# Patient Record
Sex: FEMALE | Race: WHITE
[De-identification: names, ages, dates, MRNs, and addresses within clinical notes are randomized per-mention and may not be internally consistent; named-entity substitution may affect disease eponyms.]

---

## 2021-03-01 ENCOUNTER — HOSPITAL ENCOUNTER
Dept: HOSPITAL 49 - FER | Age: 51
LOS: 1 days | Discharge: HOME | End: 2021-03-02
Attending: HOSPITALIST | Admitting: HOSPITALIST
Payer: MEDICARE

## 2021-03-01 DIAGNOSIS — K22.70: ICD-10-CM

## 2021-03-01 DIAGNOSIS — I50.9: ICD-10-CM

## 2021-03-01 DIAGNOSIS — Z90.49: ICD-10-CM

## 2021-03-01 DIAGNOSIS — R60.1: Primary | ICD-10-CM

## 2021-03-01 DIAGNOSIS — I25.10: ICD-10-CM

## 2021-03-01 DIAGNOSIS — K21.9: ICD-10-CM

## 2021-03-01 DIAGNOSIS — E78.5: ICD-10-CM

## 2021-03-01 DIAGNOSIS — J44.9: ICD-10-CM

## 2021-03-01 DIAGNOSIS — Z88.8: ICD-10-CM

## 2021-03-01 DIAGNOSIS — R00.1: ICD-10-CM

## 2021-03-01 DIAGNOSIS — Z86.73: ICD-10-CM

## 2021-03-01 DIAGNOSIS — Z95.5: ICD-10-CM

## 2021-03-01 DIAGNOSIS — Z79.82: ICD-10-CM

## 2021-03-01 DIAGNOSIS — F43.10: ICD-10-CM

## 2021-03-01 DIAGNOSIS — N18.9: ICD-10-CM

## 2021-03-01 DIAGNOSIS — Z91.041: ICD-10-CM

## 2021-03-01 DIAGNOSIS — G89.29: ICD-10-CM

## 2021-03-01 DIAGNOSIS — Z20.822: ICD-10-CM

## 2021-03-01 DIAGNOSIS — Z98.890: ICD-10-CM

## 2021-03-01 DIAGNOSIS — Z82.49: ICD-10-CM

## 2021-03-01 DIAGNOSIS — E03.9: ICD-10-CM

## 2021-03-01 DIAGNOSIS — G35: ICD-10-CM

## 2021-03-01 DIAGNOSIS — Z79.899: ICD-10-CM

## 2021-03-01 DIAGNOSIS — I25.2: ICD-10-CM

## 2021-03-01 DIAGNOSIS — R10.9: ICD-10-CM

## 2021-03-01 DIAGNOSIS — I13.0: ICD-10-CM

## 2021-03-01 DIAGNOSIS — M54.9: ICD-10-CM

## 2021-03-01 LAB
ALBUMIN SERPL-MCNC: 3.8 G/DL (ref 3.4–5)
ALKALINE PHOSHATASE: 102 U/L (ref 46–116)
ALT SERPL-CCNC: 53 U/L (ref 14–59)
AST: 25 U/L (ref 15–37)
BASOPHIL: 0.4 % (ref 0–2)
BILIRUBIN - TOTAL: 0.2 MG/DL (ref 0.2–1)
BILIRUBIN: NEGATIVE MG/DL
BLOOD: NEGATIVE ERY/UL
BUN SERPL-MCNC: 21 MG/DL (ref 7–18)
BUN/CREAT RATIO (CALC): 28.8 RATIO
CHLORIDE: 101 MMOL/L (ref 98–107)
CLARITY UR: CLEAR
CO2 (BICARBONATE): 28 MMOL/L (ref 21–32)
COLOR: YELLOW
CREATININE: 0.73 MG/DL (ref 0.51–0.95)
EOSINOPHIL: 0.5 % (ref 0–5)
GLOBULIN (CALCULATION): 3.7 G/DL
GLUCOSE (U): NORMAL MG/DL
GLUCOSE SERPL-MCNC: 111 MG/DL (ref 74–106)
HCT: 41.3 % (ref 37–47)
HGB BLD-MCNC: 13.8 G/DL (ref 12.5–16)
LEUKOCYTES: NEGATIVE LEU/UL
LYMPHOCYTE: 28.9 % (ref 15–48)
MCH RBC QN AUTO: 29.9 PG (ref 25–31)
MCHC RBC AUTO-ENTMCNC: 33.4 G/DL (ref 32–36)
MCV: 89.4 FL (ref 78–100)
MONOCYTE: 6.6 % (ref 0–12)
MPV: 10.1 FL (ref 6–9.5)
NEUTROPHIL: 63 % (ref 41–80)
NITRITE: NEGATIVE MG/DL
NRBC: 0
PLT: 264 K/UL (ref 150–400)
POTASSIUM: 3.6 MMOL/L (ref 3.5–5.1)
PROTEIN: NEGATIVE MG/DL
RBC MORPHOLOGY: NORMAL
RBC: 4.62 M/UL (ref 4.2–5.4)
RDW: 11.9 % (ref 11.5–14)
SPECIFIC GRAVITY: 1.01 (ref 1–1.03)
TOTAL PROTEIN: 7.5 G/DL (ref 6.4–8.2)
UROBILINOGEN: 0.2 MG/DL (ref 0.2–1)
WBC: 9.3 K/UL (ref 4–10.5)

## 2021-03-01 PROCEDURE — U0002 COVID-19 LAB TEST NON-CDC: HCPCS

## 2021-03-01 PROCEDURE — G0378 HOSPITAL OBSERVATION PER HR: HCPCS

## 2021-03-02 LAB
ALBUMIN SERPL-MCNC: 3.7 G/DL (ref 3.4–5)
ALKALINE PHOSHATASE: 90 U/L (ref 46–116)
ALT SERPL-CCNC: 53 U/L (ref 14–59)
AST: 29 U/L (ref 15–37)
BASOPHIL: 0.5 % (ref 0–2)
BILIRUBIN - TOTAL: 0.3 MG/DL (ref 0.2–1)
BUN SERPL-MCNC: 18 MG/DL (ref 7–18)
BUN SERPL-MCNC: 19 MG/DL (ref 7–18)
BUN/CREAT RATIO (CALC): 28.6 RATIO
BUN/CREAT RATIO (CALC): 31.1 RATIO
CHLORIDE: 102 MMOL/L (ref 98–107)
CHLORIDE: 104 MMOL/L (ref 98–107)
CHOLEST SERPL-MCNC: 248 MG/DL (ref ?–200)
CO2 (BICARBONATE): 29 MMOL/L (ref 21–32)
CO2 (BICARBONATE): 30 MMOL/L (ref 21–32)
CREATININE: 0.61 MG/DL (ref 0.51–0.95)
CREATININE: 0.63 MG/DL (ref 0.51–0.95)
EOSINOPHIL: 0.4 % (ref 0–5)
GLOBULIN (CALCULATION): 3.5 G/DL
GLUCOSE SERPL-MCNC: 116 MG/DL (ref 74–106)
GLUCOSE SERPL-MCNC: 121 MG/DL (ref 74–106)
HCT: 39.8 % (ref 37–47)
HDL: 81 MG/DL (ref 40–60)
HGB BLD-MCNC: 13.2 G/DL (ref 12.5–16)
LDL - DIRECT: 141 MG/DL (ref ?–100)
LYMPHOCYTE: 27.8 % (ref 15–48)
MAGNESIUM SERPL-MCNC: 2.2 MG/DL (ref 1.8–2.4)
MCH RBC QN AUTO: 29.7 PG (ref 25–31)
MCHC RBC AUTO-ENTMCNC: 33.2 G/DL (ref 32–36)
MCV: 89.6 FL (ref 78–100)
MONOCYTE: 6.4 % (ref 0–12)
MPV: 9.9 FL (ref 6–9.5)
NEUTROPHIL: 64.2 % (ref 41–80)
NRBC: 0
PLT: 252 K/UL (ref 150–400)
POTASSIUM: 3.8 MMOL/L (ref 3.5–5.1)
POTASSIUM: 4 MMOL/L (ref 3.5–5.1)
RBC MORPHOLOGY: NORMAL
RBC: 4.44 M/UL (ref 4.2–5.4)
RDW: 11.9 % (ref 11.5–14)
TOTAL PROTEIN: 7.2 G/DL (ref 6.4–8.2)
TRIGLYCERIDES: 150 MG/DL (ref ?–150)
WBC: 8.6 K/UL (ref 4–10.5)

## 2021-03-02 NOTE — NUR
WHILE PROVIDING PATIENT DISCHARGE INSTRUCTIONS, THIS RN ASKED IF PATIENT HAD
SOMEONE TO PICK HER UP TO TAKE HER HOME. PATIENT VERBALIZED THAT HER VEHICLE
IS PARKED OUTSIDE THE EMERGENCY ROOM AND SHE WILL BE DRIVING HERSELF HOME.
OFFERED TO TAKE PATIENT TO THE PARKING LOT VIA WHEELCHAIR AND PATIENT
DECLINED, STATING "I CAN WALK".

## 2021-03-03 LAB
DRVVT: 34.5 SEC (ref 0–47)
HEP C VIRUS AB: <0.1 (ref 0–0.9)
PTT-LA: 31.3 SEC (ref 0–51.9)

## 2021-06-11 ENCOUNTER — HOSPITAL ENCOUNTER (EMERGENCY)
Dept: HOSPITAL 49 - FER | Age: 51
Discharge: HOME | End: 2021-06-11
Payer: MEDICARE

## 2021-06-11 DIAGNOSIS — S93.402A: Primary | ICD-10-CM

## 2021-06-11 DIAGNOSIS — I25.2: ICD-10-CM
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
## 2021-06-11 DIAGNOSIS — I48.91: ICD-10-CM

## 2021-06-11 DIAGNOSIS — J44.9: ICD-10-CM

## 2021-06-11 DIAGNOSIS — Z86.711: ICD-10-CM

## 2021-06-11 DIAGNOSIS — G35: ICD-10-CM
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
## 2021-06-11 DIAGNOSIS — Z91.041: ICD-10-CM

## 2021-06-11 LAB
ALBUMIN SERPL-MCNC: 3.8 G/DL (ref 3.4–5)
ALKALINE PHOSHATASE: 105 U/L (ref 46–116)
ALT SERPL-CCNC: 62 U/L (ref 14–59)
AST: 40 U/L (ref 15–37)
BASOPHIL: 0.4 % (ref 0–2)
BILIRUBIN - TOTAL: 0.3 MG/DL (ref 0.2–1)
BUN SERPL-MCNC: 15 MG/DL (ref 7–18)
BUN/CREAT RATIO (CALC): 20 RATIO
CHLORIDE: 104 MMOL/L (ref 98–107)
CO2 (BICARBONATE): 23 MMOL/L (ref 21–32)
CREATININE: 0.75 MG/DL (ref 0.51–0.95)
EOSINOPHIL: 5 % (ref 0–5)
GLOBULIN (CALCULATION): 3.8 G/DL
GLUCOSE SERPL-MCNC: 127 MG/DL (ref 74–106)
HCT: 39.6 % (ref 37–47)
HGB BLD-MCNC: 13 G/DL (ref 12.5–16)
LYMPHOCYTE: 34.5 % (ref 15–48)
MCH RBC QN AUTO: 30.1 PG (ref 25–31)
MCHC RBC AUTO-ENTMCNC: 32.8 G/DL (ref 32–36)
MCV: 91.7 FL (ref 78–100)
MONOCYTE: 6.5 % (ref 0–12)
MPV: 9.9 FL (ref 6–9.5)
NEUTROPHIL: 53.2 % (ref 41–80)
NRBC: 0
PLT: 217 K/UL (ref 150–400)
POTASSIUM: 3.5 MMOL/L (ref 3.5–5.1)
RBC MORPHOLOGY: NORMAL
RBC: 4.32 M/UL (ref 4.2–5.4)
RDW: 12.1 % (ref 11.5–14)
TOTAL PROTEIN: 7.6 G/DL (ref 6.4–8.2)
WBC: 7.8 K/UL (ref 4–10.5)

## 2021-07-18 ENCOUNTER — HOSPITAL ENCOUNTER (EMERGENCY)
Dept: HOSPITAL 49 - FER | Age: 51
Discharge: HOME | End: 2021-07-18
Payer: MEDICARE

## 2021-07-18 DIAGNOSIS — Z79.82: ICD-10-CM

## 2021-07-18 DIAGNOSIS — Z88.8: ICD-10-CM

## 2021-07-18 DIAGNOSIS — E11.9: ICD-10-CM

## 2021-07-18 DIAGNOSIS — M23.92: Primary | ICD-10-CM

## 2021-07-18 DIAGNOSIS — Z88.0: ICD-10-CM

## 2021-07-18 DIAGNOSIS — I51.9: ICD-10-CM

## 2021-07-18 DIAGNOSIS — Z79.899: ICD-10-CM

## 2021-10-24 ENCOUNTER — HOSPITAL ENCOUNTER (EMERGENCY)
Dept: HOSPITAL 49 - FER | Age: 51
Discharge: HOME | End: 2021-10-24
Payer: MEDICARE

## 2021-10-24 DIAGNOSIS — N39.0: Primary | ICD-10-CM

## 2021-10-24 DIAGNOSIS — I48.91: ICD-10-CM

## 2021-10-24 DIAGNOSIS — J44.9: ICD-10-CM

## 2021-10-24 DIAGNOSIS — I25.10: ICD-10-CM

## 2021-10-24 DIAGNOSIS — Z91.041: ICD-10-CM

## 2021-10-24 DIAGNOSIS — R10.31: ICD-10-CM

## 2021-10-24 DIAGNOSIS — Z88.8: ICD-10-CM

## 2021-10-24 LAB
ALBUMIN SERPL-MCNC: 3.7 G/DL (ref 3.4–5)
ALKALINE PHOSHATASE: 93 U/L (ref 46–116)
ALT SERPL-CCNC: 49 U/L (ref 14–59)
AST: 28 U/L (ref 15–37)
BACTERIA: (no result)
BASOPHIL: 0.5 % (ref 0–2)
BILIRUBIN - TOTAL: 0.2 MG/DL (ref 0.2–1)
BILIRUBIN: NEGATIVE MG/DL
BLOOD: NEGATIVE ERY/UL
BUN SERPL-MCNC: 20 MG/DL (ref 7–18)
BUN/CREAT RATIO (CALC): 22 RATIO
CHLORIDE: 103 MMOL/L (ref 98–107)
CLARITY UR: CLEAR
CO2 (BICARBONATE): 28 MMOL/L (ref 21–32)
COLOR: YELLOW
CREATININE: 0.91 MG/DL (ref 0.51–0.95)
EOSINOPHIL: 2.8 % (ref 0–5)
GLOBULIN (CALCULATION): 3.6 G/DL
GLUCOSE (U): NORMAL MG/DL
GLUCOSE SERPL-MCNC: 61 MG/DL (ref 74–106)
HCT: 40 % (ref 37–47)
HGB BLD-MCNC: 13.3 G/DL (ref 12.5–16)
LEUKOCYTES: (no result) LEU/UL
LIPASE: 94 U/L (ref 73–393)
LYMPHOCYTE: 33.3 % (ref 15–48)
MCH RBC QN AUTO: 29.8 PG (ref 25–31)
MCHC RBC AUTO-ENTMCNC: 33.3 G/DL (ref 32–36)
MCV: 89.7 FL (ref 78–100)
MONOCYTE: 4.6 % (ref 0–12)
MPV: 9.5 FL (ref 6–9.5)
NEUTROPHIL: 58.5 % (ref 41–80)
NITRITE: NEGATIVE MG/DL
NRBC: 0
PLT: 225 K/UL (ref 150–400)
POTASSIUM: 3.7 MMOL/L (ref 3.5–5.1)
PROTEIN: NEGATIVE MG/DL
RBC MORPHOLOGY: NORMAL
RBC: 4.46 M/UL (ref 4.2–5.4)
RDW: 12.3 % (ref 11.5–14)
SPECIFIC GRAVITY: 1.02 (ref 1–1.03)
SQUAMOUS EPITHELIAL CELLS: (no result)
TOTAL PROTEIN: 7.3 G/DL (ref 6.4–8.2)
URINARY WBC: (no result)
UROBILINOGEN: 0.2 MG/DL (ref 0.2–1)
WBC: 6.5 K/UL (ref 4–10.5)

## 2021-12-24 ENCOUNTER — HOSPITAL ENCOUNTER (EMERGENCY)
Dept: HOSPITAL 49 - FER | Age: 51
LOS: 1 days | Discharge: HOME | End: 2021-12-25
Payer: MEDICARE

## 2021-12-24 DIAGNOSIS — Z88.5: ICD-10-CM

## 2021-12-24 DIAGNOSIS — Z86.73: ICD-10-CM

## 2021-12-24 DIAGNOSIS — R11.0: ICD-10-CM

## 2021-12-24 DIAGNOSIS — I25.2: ICD-10-CM

## 2021-12-24 DIAGNOSIS — Z91.041: ICD-10-CM

## 2021-12-24 DIAGNOSIS — Z20.822: ICD-10-CM

## 2021-12-24 DIAGNOSIS — Z88.8: ICD-10-CM

## 2021-12-24 DIAGNOSIS — R51.9: Primary | ICD-10-CM

## 2021-12-24 DIAGNOSIS — E11.9: ICD-10-CM

## 2021-12-24 LAB
BASOPHIL: 0.6 % (ref 0–2)
BUN SERPL-MCNC: 14 MG/DL (ref 7–18)
BUN/CREAT RATIO (CALC): 20 RATIO
C-REACTIVE PROTEIN: 0.8 MG/DL (ref ?–0.9)
CHLORIDE: 98 MMOL/L (ref 98–107)
CO2 (BICARBONATE): 31 MMOL/L (ref 21–32)
CORONAVIRUS 2019 SARS-COV-2: NEGATIVE
CREATININE: 0.7 MG/DL (ref 0.51–0.95)
EOSINOPHIL: 4.6 % (ref 0–5)
GLUCOSE SERPL-MCNC: 142 MG/DL (ref 74–106)
HCT: 44.5 % (ref 37–47)
HGB BLD-MCNC: 15.3 G/DL (ref 12.5–16)
INFLUENZA A NAA: NEGATIVE
LYMPHOCYTE: 28.8 % (ref 15–48)
MCH RBC QN AUTO: 29.8 PG (ref 25–31)
MCHC RBC AUTO-ENTMCNC: 34.4 G/DL (ref 32–36)
MCV: 86.6 FL (ref 78–100)
MONOCYTE: 5.4 % (ref 0–12)
MPV: 9.5 FL (ref 6–9.5)
NEUTROPHIL: 60.3 % (ref 41–80)
NRBC: 0
PLT: 227 K/UL (ref 150–400)
POTASSIUM: 3.9 MMOL/L (ref 3.5–5.1)
RBC MORPHOLOGY: NORMAL
RBC: 5.14 M/UL (ref 4.2–5.4)
RDW: 11.5 % (ref 11.5–14)
WBC: 6.9 K/UL (ref 4–10.5)

## 2021-12-24 PROCEDURE — U0002 COVID-19 LAB TEST NON-CDC: HCPCS

## 2022-02-10 ENCOUNTER — HOSPITAL ENCOUNTER (EMERGENCY)
Dept: HOSPITAL 49 - FER | Age: 52
LOS: 1 days | Discharge: HOME | End: 2022-02-11
Payer: MEDICARE

## 2022-02-10 DIAGNOSIS — Z88.5: ICD-10-CM

## 2022-02-10 DIAGNOSIS — Z88.8: ICD-10-CM

## 2022-02-10 DIAGNOSIS — F11.20: ICD-10-CM

## 2022-02-10 DIAGNOSIS — L29.9: Primary | ICD-10-CM

## 2022-02-10 DIAGNOSIS — G89.29: ICD-10-CM

## 2022-02-10 DIAGNOSIS — Z20.822: ICD-10-CM

## 2022-02-10 DIAGNOSIS — Z91.041: ICD-10-CM

## 2022-02-10 LAB
ALBUMIN SERPL-MCNC: 3.8 G/DL (ref 3.4–5)
ALKALINE PHOSHATASE: 114 U/L (ref 46–116)
ALT SERPL-CCNC: 70 U/L (ref 14–59)
AMPHETAMINES: NEGATIVE
APAP SERPL-MCNC: < 2 UG/ML (ref 10–30)
AST: 39 U/L (ref 15–37)
BARBITURATES: NEGATIVE
BASOPHIL: 0.5 % (ref 0–2)
BILIRUBIN - TOTAL: 0.2 MG/DL (ref 0.2–1)
BILIRUBIN: NEGATIVE MG/DL
BLOOD: NEGATIVE ERY/UL
BUN SERPL-MCNC: 6 MG/DL (ref 7–18)
BUN/CREAT RATIO (CALC): 10.2 RATIO
CHLORIDE: 100 MMOL/L (ref 98–107)
CLARITY UR: CLEAR
CO2 (BICARBONATE): 28 MMOL/L (ref 21–32)
COLOR: YELLOW
CORONAVIRUS 2019 SARS-COV-2: NEGATIVE
CREATININE: 0.59 MG/DL (ref 0.51–0.95)
ECSTASY (MDMA): NEGATIVE
EOSINOPHIL: 1.8 % (ref 0–5)
GLOBULIN (CALCULATION): 3.9 G/DL
GLUCOSE (U): NORMAL MG/DL
GLUCOSE SERPL-MCNC: 137 MG/DL (ref 74–106)
HCT: 37.9 % (ref 37–47)
HGB BLD-MCNC: 13.2 G/DL (ref 12.5–16)
INFLUENZA A NAA: NEGATIVE
INR PPP: 0.97 (ref 0.9–1.2)
LACTIC ACID: 1.5 MMOL/L (ref 0.4–1.9)
LEUKOCYTES: NEGATIVE LEU/UL
LYMPHOCYTE: 21.5 % (ref 15–48)
MARIJUANA (THC): NEGATIVE
MCH RBC QN AUTO: 29.3 PG (ref 25–31)
MCHC RBC AUTO-ENTMCNC: 34.8 G/DL (ref 32–36)
MCV: 84.2 FL (ref 78–100)
METHADONE: NEGATIVE
MONOCYTE: 5.5 % (ref 0–12)
MPV: 9.4 FL (ref 6–9.5)
NEUTROPHIL: 70 % (ref 41–80)
NITRITE: NEGATIVE MG/DL
NRBC: 0
OPIATES: POSITIVE
OXYCODONE: NEGATIVE
PLT: 278 K/UL (ref 150–400)
POTASSIUM: 3.8 MMOL/L (ref 3.5–5.1)
PROTEIN: NEGATIVE MG/DL
PROTHROMBIN TIME: 12.3 SECONDS (ref 11.8–13.4)
PTT: 29.6 SECONDS (ref 24.4–34.7)
RBC MORPHOLOGY: NORMAL
RBC: 4.5 M/UL (ref 4.2–5.4)
RDW: 11.8 % (ref 11.5–14)
SPECIFIC GRAVITY: 1.01 (ref 1–1.03)
TOTAL PROTEIN: 7.7 G/DL (ref 6.4–8.2)
UROBILINOGEN: 0.2 MG/DL (ref 0.2–1)
WBC: 8.2 K/UL (ref 4–10.5)

## 2022-02-10 PROCEDURE — G0480 DRUG TEST DEF 1-7 CLASSES: HCPCS

## 2022-02-10 PROCEDURE — U0002 COVID-19 LAB TEST NON-CDC: HCPCS

## 2022-05-07 ENCOUNTER — HOSPITAL ENCOUNTER (EMERGENCY)
Dept: HOSPITAL 49 - FER | Age: 52
Discharge: HOME | End: 2022-05-07
Payer: MEDICARE

## 2022-05-07 DIAGNOSIS — G43.909: Primary | ICD-10-CM

## 2022-05-07 DIAGNOSIS — Z79.51: ICD-10-CM

## 2022-05-07 DIAGNOSIS — J45.909: ICD-10-CM

## 2022-05-07 DIAGNOSIS — Z91.041: ICD-10-CM

## 2022-05-07 DIAGNOSIS — M25.512: ICD-10-CM

## 2022-05-07 DIAGNOSIS — Z28.310: ICD-10-CM

## 2022-05-07 LAB
AMORPH URATE CRY #/AREA URNS HPF: (no result) /[HPF]
BACTERIA: (no result)
BASOPHIL: 0.7 % (ref 0–2)
BILIRUBIN: NEGATIVE MG/DL
BLOOD: NEGATIVE ERY/UL
BUN SERPL-MCNC: 9 MG/DL (ref 7–18)
BUN/CREAT RATIO (CALC): 12.3 RATIO
CHLORIDE: 101 MMOL/L (ref 98–107)
CLARITY UR: CLEAR
CO2 (BICARBONATE): 23 MMOL/L (ref 21–32)
COLOR: YELLOW
CREATININE: 0.73 MG/DL (ref 0.51–0.95)
EOSINOPHIL: 1.7 % (ref 0–5)
GLUCOSE (U): NORMAL MG/DL
GLUCOSE SERPL-MCNC: 105 MG/DL (ref 74–106)
HCT: 43.8 % (ref 37–47)
HGB BLD-MCNC: 15 G/DL (ref 12.5–16)
LEUKOCYTES: (no result) LEU/UL
LYMPHOCYTE: 32.5 % (ref 15–48)
MCH RBC QN AUTO: 29 PG (ref 25–31)
MCHC RBC AUTO-ENTMCNC: 34.2 G/DL (ref 32–36)
MCV: 84.6 FL (ref 78–100)
MONOCYTE: 5.6 % (ref 0–12)
MPV: 9.3 FL (ref 6–9.5)
NEUTROPHIL: 58.9 % (ref 41–80)
NITRITE: NEGATIVE MG/DL
NRBC: 0
PLT: 294 K/UL (ref 150–400)
POTASSIUM: 4.3 MMOL/L (ref 3.5–5.1)
PROTEIN: NEGATIVE MG/DL
RBC MORPHOLOGY: NORMAL
RBC: 5.18 M/UL (ref 4.2–5.4)
RDW: 11.8 % (ref 11.5–14)
SPECIFIC GRAVITY: <=1.005 (ref 1–1.03)
SQUAMOUS EPITHELIAL CELLS: (no result)
UROBILINOGEN: 0.2 MG/DL (ref 0.2–1)
WBC: 9.1 K/UL (ref 4–10.5)

## 2022-06-22 ENCOUNTER — HOSPITAL ENCOUNTER (EMERGENCY)
Dept: HOSPITAL 49 - FER | Age: 52
Discharge: HOME | End: 2022-06-22
Payer: MEDICARE

## 2022-06-22 DIAGNOSIS — Z20.822: ICD-10-CM

## 2022-06-22 DIAGNOSIS — Z91.041: ICD-10-CM

## 2022-06-22 DIAGNOSIS — Z28.310: ICD-10-CM

## 2022-06-22 DIAGNOSIS — G43.909: Primary | ICD-10-CM

## 2022-06-22 DIAGNOSIS — N18.9: ICD-10-CM

## 2022-06-22 DIAGNOSIS — E11.22: ICD-10-CM

## 2022-06-22 DIAGNOSIS — I50.9: ICD-10-CM

## 2022-06-22 DIAGNOSIS — J44.9: ICD-10-CM

## 2022-06-22 DIAGNOSIS — Z88.8: ICD-10-CM

## 2022-06-22 LAB
ALBUMIN SERPL-MCNC: 3.7 G/DL (ref 3.4–5)
ALKALINE PHOSHATASE: 96 U/L (ref 46–116)
ALT SERPL-CCNC: 39 U/L (ref 14–59)
AMPHETAMINES: NEGATIVE
AST: 24 U/L (ref 15–37)
BARBITURATES: NEGATIVE
BASOPHIL: 0.8 % (ref 0–2)
BILIRUBIN - TOTAL: 0.2 MG/DL (ref 0.2–1)
BILIRUBIN: NEGATIVE MG/DL
BLOOD: NEGATIVE ERY/UL
BUN SERPL-MCNC: 10 MG/DL (ref 7–18)
BUN/CREAT RATIO (CALC): 15.2 RATIO
CHLORIDE: 102 MMOL/L (ref 98–107)
CLARITY UR: CLEAR
CO2 (BICARBONATE): 29 MMOL/L (ref 21–32)
COLOR: YELLOW
CORONAVIRUS 2019 SARS-COV-2: NEGATIVE
CREATININE: 0.66 MG/DL (ref 0.51–0.95)
ECSTASY (MDMA): NEGATIVE
EOSINOPHIL: 3.4 % (ref 0–5)
GLOBULIN (CALCULATION): 3.5 G/DL
GLUCOSE (U): NORMAL MG/DL
GLUCOSE SERPL-MCNC: 121 MG/DL (ref 74–106)
HCT: 40.2 % (ref 37–47)
HGB BLD-MCNC: 13.2 G/DL (ref 12.5–16)
INFLUENZA A NAA: NEGATIVE
INR PPP: 1 (ref 0.9–1.2)
LEUKOCYTES: NEGATIVE LEU/UL
LIPASE: 112 U/L (ref 73–393)
LYMPHOCYTE: 25.9 % (ref 15–48)
MARIJUANA (THC): NEGATIVE
MCH RBC QN AUTO: 29.1 PG (ref 25–31)
MCHC RBC AUTO-ENTMCNC: 32.8 G/DL (ref 32–36)
MCV: 88.7 FL (ref 78–100)
METHADONE: NEGATIVE
MONOCYTE: 5.8 % (ref 0–12)
MPV: 9.3 FL (ref 6–9.5)
NEUTROPHIL: 63.6 % (ref 41–80)
NITRITE: NEGATIVE MG/DL
NRBC: 0
OPIATES: NEGATIVE
OXYCODONE: NEGATIVE
PLT: 241 K/UL (ref 150–400)
POTASSIUM: 3.4 MMOL/L (ref 3.5–5.1)
PROTEIN: NEGATIVE MG/DL
PROTHROMBIN TIME: 12.6 SECONDS (ref 11.8–13.4)
PTT: 28.3 SECONDS (ref 24.4–34.7)
RBC MORPHOLOGY: NORMAL
RBC: 4.53 M/UL (ref 4.2–5.4)
RDW: 12.6 % (ref 11.5–14)
SPECIFIC GRAVITY: 1.02 (ref 1–1.03)
TOTAL PROTEIN: 7.2 G/DL (ref 6.4–8.2)
UROBILINOGEN: 0.2 MG/DL (ref 0.2–1)
WBC: 6.5 K/UL (ref 4–10.5)

## 2022-06-22 PROCEDURE — U0002 COVID-19 LAB TEST NON-CDC: HCPCS
